# Patient Record
Sex: FEMALE | URBAN - METROPOLITAN AREA
[De-identification: names, ages, dates, MRNs, and addresses within clinical notes are randomized per-mention and may not be internally consistent; named-entity substitution may affect disease eponyms.]

---

## 2018-06-12 ENCOUNTER — OFFICE VISIT (OUTPATIENT)
Dept: INTERNAL MEDICINE CLINIC | Age: 38
End: 2018-06-12

## 2018-06-12 VITALS
WEIGHT: 171 LBS | OXYGEN SATURATION: 99 % | HEART RATE: 80 BPM | SYSTOLIC BLOOD PRESSURE: 110 MMHG | BODY MASS INDEX: 25.33 KG/M2 | RESPIRATION RATE: 16 BRPM | HEIGHT: 69 IN | DIASTOLIC BLOOD PRESSURE: 80 MMHG | TEMPERATURE: 98.1 F

## 2018-06-12 DIAGNOSIS — Z23 ENCOUNTER FOR IMMUNIZATION: ICD-10-CM

## 2018-06-12 DIAGNOSIS — D22.9 NUMEROUS MOLES: ICD-10-CM

## 2018-06-12 DIAGNOSIS — Z00.00 ROUTINE GENERAL MEDICAL EXAMINATION AT A HEALTH CARE FACILITY: Primary | ICD-10-CM

## 2018-06-12 DIAGNOSIS — S76.312A HAMSTRING MUSCLE STRAIN, LEFT, INITIAL ENCOUNTER: ICD-10-CM

## 2018-06-12 NOTE — PATIENT INSTRUCTIONS
It was a pleasure to meet you! As discussed:    Health Maintenance   I have ordered your age appropriate labs please complete them. You will need to fast 10-12hrs before your appointment. Great job on [employer] and exercising! Remember goal for exercise is 150minutes of moderate exercise a week and diet goal is to eat 50% fruits and vegetables with minimal sugar, fat and oil daily. See health.gov or choosemyplate.gov for more details. Your cervical cancer and breast cancer screening will be completed by your ob/ gyn as scheduled. Hamstring Syndrome: Care Instructions  Your Care Instructions    The hamstring muscles are the three muscles in the back of the thigh. The sciatic nerve is a large nerve that runs from the low back down the legs. Hamstring syndrome is a condition caused by pressure on this nerve. The nerve may be pinched between the hamstring muscles and the pelvic bone or by the band of tissue that connects the hamstring muscles. This condition can cause pain in the hip and buttock and sometimes numbness down the back of the leg. It may hurt to sit down or stretch the hamstrings. You may have less pain when you lie on your back. Hamstring syndrome may be the result of wear and tear to the back and hamstrings. It is most often seen in people who play sports that involve running, kicking, or jumping. Other problems can cause leg pain and numbness. To diagnose hamstring syndrome, the doctor will ask about your symptoms and your activities and examine your leg. Hamstring syndrome usually gets better in a few weeks with rest and home care. The doctor may recommend exercises to stretch and strengthen your hip muscles. If home care doesn't help, your doctor may suggest a steroid shot to help reduce pain and swelling. Follow-up care is a key part of your treatment and safety. Be sure to make and go to all appointments, and call your doctor if you are having problems.  It's also a good idea to know your test results and keep a list of the medicines you take. How can you care for yourself at home? · Ask your doctor if you can take an over-the-counter pain medicine, such as acetaminophen (Tylenol), ibuprofen (Advil, Motrin), or naproxen (Aleve). Be safe with medicines. Read and follow all instructions on the label. · Put ice or a cold pack on the painful area for 10 to 20 minutes at a time. Try to do this every 1 to 2 hours for the next 3 days (when you are awake) or until the swelling goes down. Put a thin cloth between the ice and your skin. · After 2 or 3 days, if your swelling is gone, apply heat. Put a warm water bottle, a heating pad set on low, or a warm cloth over the painful area. Do not go to sleep with a heating pad on your skin. · Avoid sitting if possible, unless it feels better than standing. · Alternate lying down with short walks. Increase your walking distance as you are able to walk without making your symptoms worse. · Don't do anything that makes your symptoms worse. Return to your usual level of activity slowly. When should you call for help? Watch closely for changes in your health, and be sure to contact your doctor if:  ? · You have new or worse pain. ? · You have new symptoms. ? · You do not get better as expected. Where can you learn more? Go to http://rj-td.info/. Enter X635 in the search box to learn more about \"Hamstring Syndrome: Care Instructions. \"  Current as of: March 21, 2017  Content Version: 11.4  © 4584-2955 [employer]. Care instructions adapted under license by [employer] (which disclaims liability or warranty for this information). If you have questions about a medical condition or this instruction, always ask your healthcare professional. [address] any warranty or liability for your use of this information.

## 2018-06-12 NOTE — PROGRESS NOTES
Subjective:   40 y.o. female for Well Woman Check. Her gyne and breast care is done elsewhere by her Ob-Gyne physician. ROS: Feeling generally well. No TIA's or unusual headaches, no dysphagia. No prolonged cough. No dyspnea or chest pain on exertion. No abdominal pain, change in bowel habits, black or bloody stools. No urinary tract symptoms. No new or unusual musculoskeletal symptoms. Specific concerns today:    Hamstring Strain  Recently started running x 9 months. After 10K had left posterior thigh pain. Rested in May and stretching. Improving but still has occasional pain with running. SHx: Works for [employer]. [place] recently moved back. Objective: The patient appears well, alert, oriented x 3, in no distress. Visit Vitals    /80 (BP 1 Location: Right arm, BP Patient Position: Sitting)    Pulse 80    Temp 98.1 °F (36.7 °C) (Oral)    Resp 16    Ht 5' 8.75\" (1.746 m)    Wt 171 lb (77.6 kg)    SpO2 99%    BMI 25.44 kg/m2     ENT normal.  Neck supple. No adenopathy or thyromegaly. Lungs are clear, good air entry, no wheezes, rhonchi or rales. S1 and S2 normal, no murmurs, regular rate and rhythm. Abdomen soft without tenderness, guarding, mass or organomegaly. Extremities show no edema, normal peripheral pulses. Neurological is normal, no focal findings. Breast and Pelvic exams are deferred. Assessment/Plan:   Diagnoses and all orders for this visit:    1. Routine general medical examination at a health care facility- Health Maintenance reviewed and addressed as ordered below   -     LIPID PANEL  -     METABOLIC PANEL, COMPREHENSIVE  -     CBC WITH AUTOMATED DIFF  -     VITAMIN D, 25 HYDROXY  -     REFERRAL TO GYNECOLOGY  -     TSH 3RD GENERATION  -     T4, FREE    2. Hamstring muscle strain, left, initial encounter- improving. However still having pain given activity level would benefit from seeing sports medicine.   -     REFERRAL TO SPORTS MEDICINE    3.  Numerous moles- due for skin check   -     REFERRAL TO DERMATOLOGY      Follow-up Disposition:  Return in about 1 year (around 6/12/2019) for Physical - 30 minute appointment. Medication risks/benefits/costs/interactions/alternatives discussed with patient. Sal Jimenez  was given an after visit summary which includes diagnoses, current medications, & vitals. she expressed understanding with the diagnosis and plan.

## 2018-06-12 NOTE — MR AVS SNAPSHOT
[address] 
[phone] Patient: Arline Austin MRN: [id number] EWZ:5/[id number]/[id number] Visit Information Date & Time Provider Department Dept. Phone Encounter #  
 6/12/2018  2:00 PM Ludy Barron MD [address] [address] Internal Medicine [phone] [id number] Follow-up Instructions Return in about 1 year (around 6/12/2019) for Physical - 30 minute appointment. Upcoming Health Maintenance Date Due DTaP/Tdap/Td series (1 - Tdap) 9/10/2001 PAP AKA CERVICAL CYTOLOGY 9/10/2001 Influenza Age 5 to Adult 8/1/2018 Allergies as of 6/12/2018  Review Complete On: 6/12/2018 By: Ludy Barron MD  
 No Known Allergies Current Immunizations  Never Reviewed No immunizations on file. Not reviewed this visit You Were Diagnosed With   
  
 Codes Comments Routine general medical examination at a health care facility    -  Primary ICD-10-CM: Z00.00 ICD-9-CM: V70.0 Hamstring muscle strain, left, initial encounter     ICD-10-CM: U43.120B ICD-9-CM: 843.8 Numerous moles     ICD-10-CM: D22.9 ICD-9-CM: 216.9 Vitals BP Pulse Temp Resp Height(growth percentile) Weight(growth percentile) 110/80 (BP 1 Location: Right arm, BP Patient Position: Sitting) 80 98.1 °F (36.7 °C) (Oral) 16 5' 8.75\" (1.746 m) 171 lb (77.6 kg) SpO2 BMI OB Status Smoking Status 99% 25.44 kg/m2 Having regular periods Former Smoker BMI and BSA Data Body Mass Index Body Surface Area  
 25.44 kg/m 2 1.94 m 2 Your Updated Medication List  
  
Notice  As of 6/12/2018  3:05 PM  
 You have not been prescribed any medications. We Performed the Following CBC WITH AUTOMATED DIFF [35909 CPT(R)] LIPID PANEL [55307 CPT(R)] METABOLIC PANEL, COMPREHENSIVE [73838 CPT(R)] REFERRAL TO DERMATOLOGY [REF19 Custom] REFERRAL TO GYNECOLOGY [REF30 Custom] REFERRAL TO SPORTS MEDICINE [NNM179 Custom] T4, FREE [id number] CPT(R)] TSH 3RD GENERATION [70427 CPT(R)] VITAMIN D, 25 HYDROXY [id number] CPT(R)] Follow-up Instructions Return in about 1 year (around 6/12/2019) for Physical - 30 minute appointment. Referral Information Referral ID Referred By Referred To  
  
 [id number] Javan Tomas [address] [facility], [address] Phone: [phone] Fax: [phone] Visits Status Start Date End Date 1 New Request 6/12/18 6/12/19 If your referral has a status of pending review or denied, additional information will be sent to support the outcome of this decision. Referral ID Referred By Referred To  
 [id number] Brendan Méndez, [address] [place]  
   [address] [address], [address] Phone: [phone] Fax: [phone] Visits Status Start Date End Date 1 New Request 6/12/18 6/12/19 If your referral has a status of pending review or denied, additional information will be sent to support the outcome of this decision. Referral ID Referred By Referred To  
 [id number] Javan Tomas Not Available Visits Status Start Date End Date 1 New Request 6/12/18 6/12/19 If your referral has a status of pending review or denied, additional information will be sent to support the outcome of this decision. Patient Instructions It was a pleasure to meet you! As discussed: 
 
Health Maintenance I have ordered your age appropriate labs please complete them. You will need to fast 10-12hrs before your appointment. Great job on [employer] and exercising! Remember goal for exercise is 150minutes of moderate exercise a week and diet goal is to eat 50% fruits and vegetables with minimal sugar, fat and oil daily. See health.gov or choosemyplate.gov for more details. Your cervical cancer and breast cancer screening will be completed by your ob/ gyn as scheduled. Hamstring Syndrome: Care Instructions Your Care Instructions The hamstring muscles are the three muscles in the back of the thigh. The sciatic nerve is a large nerve that runs from the low back down the legs. Hamstring syndrome is a condition caused by pressure on this nerve. The nerve may be pinched between the hamstring muscles and the pelvic bone or by the band of tissue that connects the hamstring muscles. This condition can cause pain in the hip and buttock and sometimes numbness down the back of the leg. It may hurt to sit down or stretch the hamstrings. You may have less pain when you lie on your back. Hamstring syndrome may be the result of wear and tear to the back and hamstrings. It is most often seen in people who play sports that involve running, kicking, or jumping. Other problems can cause leg pain and numbness. To diagnose hamstring syndrome, the doctor will ask about your symptoms and your activities and examine your leg. Hamstring syndrome usually gets better in a few weeks with rest and home care. The doctor may recommend exercises to stretch and strengthen your hip muscles. If home care doesn't help, your doctor may suggest a steroid shot to help reduce pain and swelling. Follow-up care is a key part of your treatment and safety. Be sure to make and go to all appointments, and call your doctor if you are having problems. It's also a good idea to know your test results and keep a list of the medicines you take. How can you care for yourself at home? · Ask your doctor if you can take an over-the-counter pain medicine, such as acetaminophen (Tylenol), ibuprofen (Advil, Motrin), or naproxen (Aleve). Be safe with medicines. Read and follow all instructions on the label.  
· Put ice or a cold pack on the painful area for 10 to 20 minutes at a time. Try to do this every 1 to 2 hours for the next 3 days (when you are awake) or until the swelling goes down. Put a thin cloth between the ice and your skin. · After 2 or 3 days, if your swelling is gone, apply heat. Put a warm water bottle, a heating pad set on low, or a warm cloth over the painful area. Do not go to sleep with a heating pad on your skin. · Avoid sitting if possible, unless it feels better than standing. · Alternate lying down with short walks. Increase your walking distance as you are able to walk without making your symptoms worse. · Don't do anything that makes your symptoms worse. Return to your usual level of activity slowly. When should you call for help? Watch closely for changes in your health, and be sure to contact your doctor if: 
? · You have new or worse pain. ? · You have new symptoms. ? · You do not get better as expected. Where can you learn more? Go to http://rj-td.info/. Enter X499 in the search box to learn more about \"Hamstring Syndrome: Care Instructions. \" Current as of: March 21, 2017 Content Version: 11.4 © 2704-9735 [employer]. Care instructions adapted under license by [employer] (which disclaims liability or warranty for this information). If you have questions about a medical condition or this instruction, always ask your healthcare professional. [address] any warranty or liability for your use of this information. Introducing [facility]! Essence Gregory introduces [employer] patient portal. Now you can access parts of your medical record, email your doctor's office, and request medication refills online. 1. In your internet browser, go to [email or web address] 2. Click on the First Time User? Click Here link in the Sign In box. You will see the New Member Sign Up page. 3. Enter your [employer] Access Code exactly as it appears below.  You will not need to use this code after youve completed the sign-up process. If you do not sign up before the expiration date, you must request a new code. · [employer] Access Code: [email or web address] Expires: 9/10/2018  1:47 PM 
 
4. Enter the last four digits of your Social Security Number (xxxx) and Date of Birth (mm/dd/yyyy) as indicated and click Submit. You will be taken to the next sign-up page. 5. Create a [employer] ID. This will be your [employer] login ID and cannot be changed, so think of one that is secure and easy to remember. 6. Create a [employer] password. You can change your password at any time. 7. Enter your Password Reset Question and Answer. This can be used at a later time if you forget your password. 8. Enter your e-mail address. You will receive e-mail notification when new information is available in [address]. 9. Click Sign Up. You can now view and download portions of your medical record. 10. Click the Download Summary menu link to download a portable copy of your medical information. If you have questions, please visit the Frequently Asked Questions section of the [employer] website. Remember, [employer] is NOT to be used for urgent needs. For medical emergencies, dial 911. Now available from your iPhone and Android! Please provide this summary of care documentation to your next provider. Your primary care clinician is listed as Temo Segura. If you have any questions after today's visit, please call [phone].

## 2018-06-15 LAB
25(OH)D3+25(OH)D2 SERPL-MCNC: 32.8 NG/ML (ref 30–100)
ALBUMIN SERPL-MCNC: 4.4 G/DL (ref 3.5–5.5)
ALBUMIN/GLOB SERPL: 1.8 {RATIO} (ref 1.2–2.2)
ALP SERPL-CCNC: 34 IU/L (ref 39–117)
ALT SERPL-CCNC: 8 IU/L (ref 0–32)
AST SERPL-CCNC: 12 IU/L (ref 0–40)
BASOPHILS # BLD AUTO: 0.1 X10E3/UL (ref 0–0.2)
BASOPHILS NFR BLD AUTO: 1 %
BILIRUB SERPL-MCNC: 0.6 MG/DL (ref 0–1.2)
BUN SERPL-MCNC: 13 MG/DL (ref 6–20)
BUN/CREAT SERPL: 18 (ref 9–23)
CALCIUM SERPL-MCNC: 9.3 MG/DL (ref 8.7–10.2)
CHLORIDE SERPL-SCNC: 104 MMOL/L (ref 96–106)
CHOLEST SERPL-MCNC: 183 MG/DL (ref 100–199)
CO2 SERPL-SCNC: 24 MMOL/L (ref 20–29)
CREAT SERPL-MCNC: 0.73 MG/DL (ref 0.57–1)
EOSINOPHIL # BLD AUTO: 0.1 X10E3/UL (ref 0–0.4)
EOSINOPHIL NFR BLD AUTO: 2 %
ERYTHROCYTE [DISTWIDTH] IN BLOOD BY AUTOMATED COUNT: 13.5 % (ref 12.3–15.4)
GFR SERPLBLD CREATININE-BSD FMLA CKD-EPI: 106 ML/MIN/1.73
GFR SERPLBLD CREATININE-BSD FMLA CKD-EPI: 122 ML/MIN/1.73
GLOBULIN SER CALC-MCNC: 2.4 G/DL (ref 1.5–4.5)
GLUCOSE SERPL-MCNC: 88 MG/DL (ref 65–99)
HCT VFR BLD AUTO: 39.8 % (ref 34–46.6)
HDLC SERPL-MCNC: 72 MG/DL
HGB BLD-MCNC: 13.4 G/DL (ref 11.1–15.9)
IMM GRANULOCYTES # BLD: 0 X10E3/UL (ref 0–0.1)
IMM GRANULOCYTES NFR BLD: 0 %
LDLC SERPL CALC-MCNC: 99 MG/DL (ref 0–99)
LYMPHOCYTES # BLD AUTO: 1.5 X10E3/UL (ref 0.7–3.1)
LYMPHOCYTES NFR BLD AUTO: 26 %
MCH RBC QN AUTO: 32 PG (ref 26.6–33)
MCHC RBC AUTO-ENTMCNC: 33.7 G/DL (ref 31.5–35.7)
MCV RBC AUTO: 95 FL (ref 79–97)
MONOCYTES # BLD AUTO: 0.4 X10E3/UL (ref 0.1–0.9)
MONOCYTES NFR BLD AUTO: 7 %
NEUTROPHILS # BLD AUTO: 3.5 X10E3/UL (ref 1.4–7)
NEUTROPHILS NFR BLD AUTO: 64 %
PLATELET # BLD AUTO: 238 X10E3/UL (ref 150–379)
POTASSIUM SERPL-SCNC: 4.4 MMOL/L (ref 3.5–5.2)
PROT SERPL-MCNC: 6.8 G/DL (ref 6–8.5)
RBC # BLD AUTO: 4.19 X10E6/UL (ref 3.77–5.28)
SODIUM SERPL-SCNC: 140 MMOL/L (ref 134–144)
T4 FREE SERPL-MCNC: 1.13 NG/DL (ref 0.82–1.77)
TRIGL SERPL-MCNC: 58 MG/DL (ref 0–149)
TSH SERPL DL<=0.005 MIU/L-ACNC: 1.85 UIU/ML (ref 0.45–4.5)
VLDLC SERPL CALC-MCNC: 12 MG/DL (ref 5–40)
WBC # BLD AUTO: 5.5 X10E3/UL (ref 3.4–10.8)

## 2018-06-20 NOTE — PROGRESS NOTES
Thank you for completing your labs. Your thyroid, kidney and liver function is normal.   Your cholesterol is well controlled. No medication changes are needed. Do not hesitate to contact the office if you have any questions or concerns before your next appointment.      Kind regards,   Dr. Ferro Breezy

## 2018-06-28 ENCOUNTER — OFFICE VISIT (OUTPATIENT)
Dept: INTERNAL MEDICINE CLINIC | Age: 38
End: 2018-06-28

## 2018-06-28 VITALS
WEIGHT: 168.2 LBS | DIASTOLIC BLOOD PRESSURE: 63 MMHG | TEMPERATURE: 98.4 F | HEART RATE: 80 BPM | BODY MASS INDEX: 24.91 KG/M2 | RESPIRATION RATE: 16 BRPM | HEIGHT: 69 IN | SYSTOLIC BLOOD PRESSURE: 116 MMHG | OXYGEN SATURATION: 97 %

## 2018-06-28 DIAGNOSIS — M76.892 HAMSTRING TENDONITIS OF LEFT THIGH: Primary | ICD-10-CM

## 2018-06-28 DIAGNOSIS — M22.2X2 PATELLOFEMORAL SYNDROME OF BOTH KNEES: ICD-10-CM

## 2018-06-28 DIAGNOSIS — M22.2X1 PATELLOFEMORAL SYNDROME OF BOTH KNEES: ICD-10-CM

## 2018-06-28 NOTE — PROGRESS NOTES
Chief Complaint   Patient presents with    Muscle Pain     she is a 40y.o. year old female who presents for evaluation of back leg pain  Pain Assessment Encounter      Oskar Frazier  6/28/2018  Onset of Symptoms: April 2018  ________________________________________________________________________  Description: Patient states that she ran the race and she felt pain after and thinks she pulled a muscle. Frequency: more than 5 times a day  Pain Scale:(1-10): 7  Trauma Hx: running  Hx of similar symptoms: No:   Radiation: NO,   Duration:  continuous      Progression: has improved  What makes it better?: ice, OTC meds and rest  What makes it worse?:exercise  Medications tried: acetaminophen, ibuprofen    Reviewed and agree with Nurse Note and duplicated in this note. Reviewed PmHx, RxHx, FmHx, SocHx, AllgHx and updated and dated in the chart. History reviewed. No pertinent family history. History reviewed. No pertinent past medical history.    Social History     Social History    Marital status: UNKNOWN     Spouse name: N/A    Number of children: N/A    Years of education: N/A     Social History Main Topics    Smoking status: Former Smoker     Types: Cigarettes     Quit date: 1/1/2006    Smokeless tobacco: Never Used    Alcohol use 0.0 oz/week     0 Glasses of wine per week      Comment: 1-2 monthly    Drug use: No    Sexual activity: Yes     Partners: Male     Birth control/ protection: Condom     Other Topics Concern    None     Social History Narrative        Review of Systems - negative except as listed above      Objective:     Vitals:    06/28/18 1420   BP: 116/63   Pulse: 80   Resp: 16   Temp: 98.4 °F (36.9 °C)   TempSrc: Oral   SpO2: 97%   Weight: 168 lb 3.2 oz (76.3 kg)   Height: 5' 8.75\" (1.746 m)       Physical Examination: General appearance - alert, well appearing, and in no distress  Back exam - full range of motion, no tenderness, palpable spasm or pain on motion  Neurological - alert, oriented, normal speech, no focal findings or movement disorder noted  Musculoskeletal - left hamstring - biceps femoris pain with palpation of midbelly, no defect noted, flexion at knee 5 out of 5 extension  5 out of 5  Extremities - peripheral pulses normal, no pedal edema, no clubbing or cyanosis  Skin - normal coloration and turgor, no rashes, no suspicious skin lesions noted    Assessment/ Plan:   Diagnoses and all orders for this visit:    1. Hamstring tendonitis of left thigh    2. Patellofemoral syndrome of both knees       Pathophysiology, recovery and rehabilitation process discussed and questions answered   Counseling for 30 Minutes of the total visit duration   Pictures and figures used as necessary   Provided reassurance   Handouts provided and reviewed with patient for Hamstring and patella femoral  Monitor response to home exercises   Recommend activity modification   Recommend  lower impact activities-walking, Eliptical, Nordic Track, cycling or swimming   Follow up in 4 week(s)     Patient was informed/counseled on: Body mass index is 25.02 kg/(m^2). 1) Remember to stay active and/or exercise regularly (I suggest 30-45 minutes daily)   2) For reliable dietary information, go to www. EATRIGHT.org. You may wish to consider seeing the nutritionist at [facility] [phone], also consider the [address] I have discussed the diagnosis with the patient and the intended plan as seen in the above orders. The patient has received an after-visit summary and questions were answered concerning future plans. Medication Side Effects and Warnings were discussed with patient: yes  Patient Labs were reviewed and or requested: yes  Patient Past Records were reviewed and or requested  yes  I have discussed the diagnosis with the patient and the intended plan as seen in the above orders.   The patient has received an after-visit summary and questions were answered concerning future plans.     Pt agrees to call or return to clinic and/or go to closest ER with any worsening of symptoms. This may include, but not limited to increased fever (>100.4) with NSAIDS or Tylenol, increased edema, confusion, rash, worsening of presenting symptoms.

## 2018-06-28 NOTE — MR AVS SNAPSHOT
[address] [facility]. [address] [address] 
[phone] Patient: Melinda Neal MRN: [id number] UNE:[birth year] Visit Information Date & Time Provider Department Dept. Phone Encounter #  
 6/28/2018  2:00 PM [facility] MD Molly [facility] Sports Medicine and Primary Care [phone] [id number] Follow-up Instructions Return if symptoms worsen or fail to improve. Follow-up and Disposition History Your Appointments 7/18/2018  9:00 AM  
New Patient with Augusto Olmedo MD  
[address] ([address]) Appt Note: [address] [facility] [address]  
[address], [address] [facility] [address] [address]  
  
    
 6/13/2019  2:00 PM  
PHYSICAL with Erik Everett MD  
[address] [address] Internal Medicine [address] [facility]) Appt Note: [address] [facility] [address] [facility] [address]  
[address] [address] [facility] [address] Upcoming Health Maintenance Date Due  
 PAP AKA CERVICAL CYTOLOGY 9/10/2001 Influenza Age 5 to Adult 8/1/2018 DTaP/Tdap/Td series (2 - Td) 6/12/2028 Allergies as of 6/28/2018  Review Complete On: 6/28/2018 By: [facility] MD Molly  
 No Known Allergies Current Immunizations  Reviewed on 6/12/2018 Name Date Tdap 6/12/2018 Not reviewed this visit You Were Diagnosed With   
  
 Codes Comments Hamstring tendonitis of left thigh    -  Primary ICD-10-CM: S03.865 ICD-9-CM: 727.09 Patellofemoral syndrome of both knees     ICD-10-CM: M22.2X1, M22.2X2 ICD-9-CM: 719.46 Vitals BP Pulse Temp Resp Height(growth percentile) Weight(growth percentile) 116/63 (BP 1 Location: Right arm, BP Patient Position: Sitting) 80 98.4 °F (36.9 °C) (Oral) 16 5' 8.75\" (1.746 m) 168 lb 3.2 oz (76.3 kg) LMP SpO2 BMI OB Status Smoking Status 06/14/2018 (Approximate) 97% 25.02 kg/m2 Having regular periods Former Smoker Vitals History BMI and BSA Data Body Mass Index Body Surface Area 25.02 kg/m 2 1.92 m 2 Your Updated Medication List  
  
Notice  As of 6/28/2018  3:34 PM  
 You have not been prescribed any medications. Follow-up Instructions Return if symptoms worsen or fail to improve. Introducing [facility]! Dear Carlos Rodríguez: 
Thank you for requesting a [employer] account. Our records indicate that you already have an active [employer] account. You can access your account anytime at [email or web address] Did you know that you can access your hospital and ER discharge instructions at any time in Payfirma? You can also review all of your test results from your hospital stay or ER visit. Additional Information If you have questions, please visit the Frequently Asked Questions section of the Payfirma website at [email or web address] Remember, [employer] is NOT to be used for urgent needs. For medical emergencies, dial 911. Now available from your iPhone and Android! Please provide this summary of care documentation to your next provider. Your primary care clinician is listed as Radha Perera. If you have any questions after today's visit, please call [phone].

## 2018-07-18 ENCOUNTER — OFFICE VISIT (OUTPATIENT)
Dept: OBGYN CLINIC | Age: 38
End: 2018-07-18

## 2018-07-18 VITALS
DIASTOLIC BLOOD PRESSURE: 74 MMHG | SYSTOLIC BLOOD PRESSURE: 112 MMHG | WEIGHT: 163.8 LBS | BODY MASS INDEX: 24.26 KG/M2 | HEIGHT: 69 IN

## 2018-07-18 DIAGNOSIS — Z01.419 WELL WOMAN EXAM: Primary | ICD-10-CM

## 2018-07-18 DIAGNOSIS — Z11.51 SCREENING FOR HUMAN PAPILLOMAVIRUS: ICD-10-CM

## 2018-07-18 NOTE — PROGRESS NOTES
Annual exam    Andrew Sepulveda is a 40 y.o. G0 presenting for annual exam. Overall doing well. Interested in [address] IUD. Ob/Gyn Hx:  G0 -  LMP- 7/11/18  Menarche- age 15  Menses- regular monthly cycles? yes, last 5 days, passage of clots? no, intermenstrual spotting? no, Number of pads/tampons per day? 3  Contraception- condoms  STI- denies  ? SA- not currently    Health maintenance:  Pap- few years ago, normal per patient  Mammo- not indicated  Colonoscopy- not indicated  Gardasil- outside of recommended age    History reviewed. No pertinent past medical history. Past Surgical History:   Procedure Laterality Date    HX ORTHOPAEDIC  2011    finger       Family History   Problem Relation Age of Onset    Celiac Disease Mother     No Known Problems Father     Bipolar Disorder Sister     Breast Cancer Paternal Grandmother        Social History     Social History    Marital status: SINGLE     Spouse name: N/A    Number of children: N/A    Years of education: N/A     Occupational History    Not on file. Social History Main Topics    Smoking status: Former Smoker     Types: Cigarettes     Quit date: 1/1/2006    Smokeless tobacco: Never Used    Alcohol use 0.0 oz/week     0 Glasses of wine per week      Comment: 1-2 monthly    Drug use: No    Sexual activity: Not Currently     Partners: Male     Birth control/ protection: Condom     Other Topics Concern    Not on file     Social History Narrative       Current Outpatient Prescriptions   Medication Sig Dispense Refill    MELATONIN PO Take  by mouth.  BIOTIN PO Take  by mouth.  MULTIVITAMIN PO Take  by mouth.          No Known Allergies    Review of Systems - History obtained from the patient  Constitutional: negative for weight loss, fever, night sweats  HEENT: negative for hearing loss, earache, congestion, snoring, sorethroat  CV: negative for chest pain, palpitations, edema  Resp: negative for cough, shortness of breath, wheezing  GI: negative for change in bowel habits, abdominal pain, black or bloody stools  : negative for frequency, dysuria, hematuria, vaginal discharge  MSK: negative for back pain, joint pain, muscle pain  Breast: negative for breast lumps, nipple discharge, galactorrhea  Skin :negative for itching, rash, hives  Neuro: negative for dizziness, headache, confusion, weakness  Psych: negative for anxiety, depression, change in mood  Heme/lymph: negative for bleeding, bruising, pallor    Physical Exam    Visit Vitals    /74 (BP 1 Location: Left arm, BP Patient Position: Sitting)    Ht 5' 8.75\" (1.746 m)    Wt 163 lb 12.8 oz (74.3 kg)    LMP 07/11/2018 (Exact Date)    BMI 24.37 kg/m2       Constitutional  · Appearance: well-nourished, well developed, alert, in no acute distress    HENT  · Head and Face: appears normal    Neck  · Inspection/Palpation: normal appearance, no masses or tenderness  · Lymph Nodes: no lymphadenopathy present  · Thyroid: gland size normal, nontender, no nodules or masses present on palpation    Chest  · Respiratory Effort: non-labored breathing  · Auscultation: CTAB, normal breath sounds    Cardiovascular  · Heart:  · Auscultation: regular rate and rhythm without murmur  · Extremities: no peripheral edema    Breasts  · Inspection of Breasts: breasts symmetrical, no skin changes, no discharge present, nipple appearance normal, no skin retraction present  · Palpation of Breasts and Axillae: no masses present on palpation, no breast tenderness  · Axillary Lymph Nodes: no lymphadenopathy present    Gastrointestinal  · Abdominal Examination: abdomen non-tender to palpation, normal bowel sounds, no masses present  · Liver and spleen: no hepatomegaly present, spleen not palpable  · Hernias: no hernias identified    Genitourinary  · External Genitalia: normal appearance for age, no discharge present, no tenderness present, no inflammatory lesions present, no masses present, no atrophy present  · Vagina: normal vaginal vault without central or paravaginal defects, no discharge present, no inflammatory lesions present, no masses present  · Bladder: non-tender to palpation  · Urethra: appears normal  · Cervix: normal   · Uterus: normal size, shape and consistency  · Adnexa: no adnexal tenderness present, no adnexal masses present  · Perineum: perineum within normal limits, no evidence of trauma, no rashes or skin lesions present    Skin  · General Inspection: no rash, no lesions identified    Neurologic/Psychiatric  · Mental Status:  · Orientation: grossly oriented to person, place and time  · Mood and Affect: mood normal, affect appropriate      Assessment/Plan:  40 y.o. G0 presenting for annual exam. Overall doing well.      Health Maintenance:  -counseled re: diet, exercise, healthy lifestyle  -pap/HPV today  -declines STI testing  -initiate mammo age 36, self breast assessments  -reviewed hormonal options for cycle control, interested in Mirena IUD, information provided, will call to schedule appt for insertion    RTC: 1 year for AE or sooner for Mirena insertion    Gerda Lyons MD  7/18/2018  9:48 AM

## 2018-07-18 NOTE — PATIENT INSTRUCTIONS
Pap Test: Care Instructions  Your Care Instructions    The Pap test (also called a Pap smear) is a screening test for cancer of the cervix, which is the lower part of the uterus that opens into the vagina. The test can help your doctor find early changes in the cells that could lead to cancer. The sample of cells taken during your test has been sent to a lab so that an expert can look at the cells. It usually takes a week or two to get the results back. Follow-up care is a key part of your treatment and safety. Be sure to make and go to all appointments, and call your doctor if you are having problems. It's also a good idea to know your test results and keep a list of the medicines you take. What do the results mean? · A normal result means that the test did not find any abnormal cells in the sample. · An abnormal result can mean many things. Most of these are not cancer. The results of your test may be abnormal because:  ¨ You have an infection of the vagina or cervix, such as a yeast infection. ¨ You have an IUD (intrauterine device for birth control). ¨ You have low estrogen levels after menopause that are causing the cells to change. ¨ You have cell changes that may be a sign of precancer or cancer. The results are ranked based on how serious the changes might be. There are many other reasons why you might not get a normal result. If the results were abnormal, you may need to get another test within a few weeks or months. If the results show changes that could be a sign of cancer, you may need a test called a colposcopy, which provides a more complete view of the cervix. Sometimes the lab cannot use the sample because it does not contain enough cells or was not preserved well. If so, you may need to have the test again. This is not common, but it does happen from time to time. When should you call for help?   Watch closely for changes in your health, and be sure to contact your doctor if:    · You have vaginal bleeding or pain for more than 2 days after the test. It is normal to have a small amount of bleeding for a day or two after the test.   Where can you learn more? Go to http://rj-td.info/. Enter K812 in the search box to learn more about \"Pap Test: Care Instructions. \"  Current as of: May 12, 2017  Content Version: 11.7  © 2602-7135 [employer]. Care instructions adapted under license by [employer] (which disclaims liability or warranty for this information). If you have questions about a medical condition or this instruction, always ask your healthcare professional. [address] any warranty or liability for your use of this information.

## 2018-07-22 LAB
CYTOLOGIST CVX/VAG CYTO: ABNORMAL
CYTOLOGY CVX/VAG DOC THIN PREP: ABNORMAL
DX ICD CODE: ABNORMAL
DX ICD CODE: ABNORMAL
HPV I/H RISK 4 DNA CVX QL PROBE+SIG AMP: NEGATIVE
Lab: ABNORMAL
OTHER STN SPEC: ABNORMAL
PATH REPORT.FINAL DX SPEC: ABNORMAL
PATHOLOGIST CVX/VAG CYTO: ABNORMAL
STAT OF ADQ CVX/VAG CYTO-IMP: ABNORMAL

## 2018-08-08 ENCOUNTER — OFFICE VISIT (OUTPATIENT)
Dept: OBGYN CLINIC | Age: 38
End: 2018-08-08

## 2018-08-08 VITALS
HEIGHT: 69 IN | BODY MASS INDEX: 24.65 KG/M2 | DIASTOLIC BLOOD PRESSURE: 76 MMHG | SYSTOLIC BLOOD PRESSURE: 110 MMHG | WEIGHT: 166.4 LBS

## 2018-08-08 DIAGNOSIS — Z30.430 VISIT FOR INSERTION OF INTRAUTERINE DEVICE: Primary | ICD-10-CM

## 2018-08-08 LAB
HCG URINE, QL. (POC): NEGATIVE
VALID INTERNAL CONTROL?: YES

## 2018-08-08 NOTE — PATIENT INSTRUCTIONS
Intrauterine Device (IUD) Insertion: Care Instructions  Your Care Instructions    The intrauterine device (IUD) is a very effective method of birth control. It is a small, plastic, T-shaped device that contains copper or hormones. The doctor inserts the IUD into your uterus. A plastic string tied to the end of the IUD hangs down through the cervix into the vagina. There are two types of IUDs. The copper IUD is effective for up to 10 years. The hormonal IUD is effective for either 3 years or 5 years, depending on which IUD is used. The hormonal IUD also reduces menstrual bleeding and cramping. Both types of IUD damage or kill the man's sperm. This means that the woman's egg does not join with the sperm. IUDs also change the lining of the uterus so that the egg does not lodge there. The IUD is most likely to work well for women who have been pregnant before. Some women who have never been pregnant have more trouble keeping the IUD in the uterus. They also may have more pain and cramping after insertion. Follow-up care is a key part of your treatment and safety. Be sure to make and go to all appointments, and call your doctor if you are having problems. It's also a good idea to know your test results and keep a list of the medicines you take. How can you care for yourself at home? · You may experience some mild cramping and light bleeding (spotting) for 1 or 2 days. Use a hot water bottle or a heating pad set on low on your belly for pain. · Take an over-the-counter pain medicine, such as acetaminophen (Tylenol), ibuprofen (Advil, Motrin), and naproxen (Aleve) if needed. Read and follow all instructions on the label. · Do not take two or more pain medicines at the same time unless the doctor told you to. Many pain medicines have acetaminophen, which is Tylenol. Too much acetaminophen (Tylenol) can be harmful. · Check the string of your IUD after every period.  To do this, insert a finger into your vagina and feel for the cervix, which is at the top of the vagina and feels harder than the rest of your vagina. You should be able to feel the thin, plastic string coming out of the opening of your cervix. If you cannot feel the string, use another form of birth control and make an appointment with your doctor to have the string checked. · If the IUD comes out, save it and call your doctor. Be sure to use another form of birth control while the IUD is out. · Use latex condoms to protect against sexually transmitted infections (STIs), such as gonorrhea and chlamydia. An IUD does not protect you from STIs. Having one sex partner (who does not have STIs and does not have sex with anyone else) is a good way to avoid STIs. When should you call for help? Call 911 anytime you think you may need emergency care. For example, call if:    · You passed out (lost consciousness).     · You have sudden, severe pain in your belly or pelvis.    Call your doctor now or seek immediate medical care if:    · You have new belly or pelvic pain.     · You have severe vaginal bleeding. This means that you are soaking through your usual pads or tampons each hour for 2 or more hours.     · You are dizzy or lightheaded, or you feel like you may faint.     · You have a fever and pelvic pain or vaginal discharge.     · You have pelvic pain that is getting worse.    Watch closely for changes in your health, and be sure to contact your doctor if:    · You cannot feel the string, or the IUD comes out.     · You feel sick to your stomach, or you vomit.     · You think you may be pregnant. Where can you learn more? Go to http://rj-td.info/. Enter N431 in the search box to learn more about \"Intrauterine Device (IUD) Insertion: Care Instructions. \"  Current as of: November 21, 2017  Content Version: 11.7  © 5377-1886 [employer].  Care instructions adapted under license by [employer] (which disclaims liability or warranty for this information). If you have questions about a medical condition or this instruction, always ask your healthcare professional. [place] [address] any warranty or liability for your use of this information.

## 2018-08-08 NOTE — PROGRESS NOTES
[facility]  OFFICE PROCEDURE PROGRESS NOTE        Chart reviewed for the following:   Jana Arenas, have reviewed the History, Physical and updated the Allergic reactions for [address] performed immediately prior to start of procedure:   Jana Arenas, have performed the following reviews on Gabby Carrillo prior to the start of the procedure:            * Patient was identified by name and date of birth   * Agreement on procedure being performed was verified  * Risks and Benefits explained to the patient  * Procedure site verified and marked as necessary  * Patient was positioned for comfort  * Consent was signed and verified     Time: 11:40      Date of procedure: 8/8/2018    Procedure performed by:  Aileen Green MD    Provider assisted by: Nereyda Sons LPN    Patient assisted by: self    How tolerated by patient: tolerated the procedure well with no complications    Post Procedural Pain Scale: 0 - No Hurt    Comments: none      Mirena IUD INSERTION  Indications: Gabby Carrillo is a No obstetric history on file. ,  40 y.o. female [facility] Patient's last menstrual period was 07/11/2018 (exact date). Her LMP was normal in duration and amount of flow. She  presents for insertion of an IUD. The risks, benefits and alternatives of IUD insertion were discussed in detail at last visit and reviewed again today. She also has reviewed Mirena information. She has elected to proceed with the insertion today and she states she has no further questions. A urine pregnancy test was negative. Procedure: The pelvic exam revealed normal external genitalia. On bimanual exam the uterus was  anteverted and normal in size with no tenderness present. A speculum was inserted into the vagina and the cervix was visualized. The cervix was prepped with zephiran solution. The anterior lip of the cervix was grasped with a single toothed tenaculum.  The uterus was sounded with a Lyman sound to 7 centimeters. A Mirena was then inserted without difficulty. The string was cut to 3 centimeters. She experienced a mild  amount of cramping. Post Procedure Status:   She tolerated the procedure with mild discomfort. The patient was observed for 10 minutes after the insertion. There were no complications. Patient was discharged in stable condition. The patient received Mirena lot number [id number].

## 2019-06-13 ENCOUNTER — OFFICE VISIT (OUTPATIENT)
Dept: INTERNAL MEDICINE CLINIC | Age: 39
End: 2019-06-13

## 2019-06-13 VITALS
HEART RATE: 77 BPM | OXYGEN SATURATION: 98 % | BODY MASS INDEX: 25.48 KG/M2 | WEIGHT: 172 LBS | RESPIRATION RATE: 16 BRPM | SYSTOLIC BLOOD PRESSURE: 106 MMHG | HEIGHT: 69 IN | DIASTOLIC BLOOD PRESSURE: 70 MMHG | TEMPERATURE: 98.4 F

## 2019-06-13 DIAGNOSIS — Z00.00 WELL WOMAN EXAM (NO GYNECOLOGICAL EXAM): Primary | ICD-10-CM

## 2019-06-13 DIAGNOSIS — H61.21 CERUMINOSIS, RIGHT: ICD-10-CM

## 2019-06-13 NOTE — PROGRESS NOTES
HISTORY OF PRESENT ILLNESS  Fabiola Mina is a 45 y.o. female. HPI  Health Maintenance   Topic Date Due    Influenza Age 5 to Adult  08/01/2019    PAP AKA CERVICAL CYTOLOGY  07/18/2021    DTaP/Tdap/Td series (2 - Td) 06/12/2028    Pneumococcal 0-64 years  Aged Out     Cardiovascular Review:  The patient has no known cardiovascular conditions. Diet and Lifestyle: generally follows a low fat low cholesterol diet, generally follows a low sodium diet, exercises regularly, nonsmoker,   Home BP Monitoring: is not measured at home. Pertinent ROS: no TIA's, no chest pain on exertion, no dyspnea on exertion, no swelling of ankles. ROSper HPI   There are no active problems to display for this patient. Current Outpatient Medications   Medication Sig Dispense Refill    MULTIVITAMIN PO Take  by mouth.  MELATONIN PO Take  by mouth.  BIOTIN PO Take  by mouth. No Known Allergies   Visit Vitals  /70 (BP 1 Location: Right arm)   Pulse 77   Temp 98.4 °F (36.9 °C) (Oral)   Resp 16   Ht 5' 9\" (1.753 m)   Wt 172 lb (78 kg)   SpO2 98%   BMI 25.40 kg/m²       Physical Exam   Constitutional: She is oriented to person, place, and time. She appears well-developed and well-nourished. HENT:   Right Ear: External ear normal.   Left Ear: External ear normal.   Ears:    Mouth/Throat: Oropharynx is clear and moist. No oropharyngeal exudate. Eyes: Conjunctivae are normal. No scleral icterus. Neck: Normal range of motion. Neck supple. No thyromegaly present. Cardiovascular: Normal rate, regular rhythm and normal heart sounds. Exam reveals no gallop and no friction rub. No murmur heard. Pulmonary/Chest: Effort normal and breath sounds normal. No respiratory distress. She has no wheezes. She has no rales. She exhibits no tenderness. Abdominal: Soft. Bowel sounds are normal. She exhibits no distension. There is no tenderness. There is no rebound and no guarding.    Genitourinary:   Genitourinary Comments: Up to date, completed by gynecologist.       Musculoskeletal: Normal range of motion. She exhibits no edema or tenderness. Neurological: She is alert and oriented to person, place, and time. Lab Results   Component Value Date/Time    WBC 5.5 06/14/2018 08:24 AM    HGB 13.4 06/14/2018 08:24 AM    HCT 39.8 06/14/2018 08:24 AM    PLATELET 211 39/34/4728 08:24 AM    MCV 95 06/14/2018 08:24 AM     Lab Results   Component Value Date/Time    Glucose 88 06/14/2018 08:24 AM    LDL, calculated 99 06/14/2018 08:24 AM    Creatinine 0.73 06/14/2018 08:24 AM      Lab Results   Component Value Date/Time    Cholesterol, total 183 06/14/2018 08:24 AM    HDL Cholesterol 72 06/14/2018 08:24 AM    LDL, calculated 99 06/14/2018 08:24 AM    Triglyceride 58 06/14/2018 08:24 AM     Lab Results   Component Value Date/Time    ALT (SGPT) 8 06/14/2018 08:24 AM    AST (SGOT) 12 06/14/2018 08:24 AM    Alk. phosphatase 34 (L) 06/14/2018 08:24 AM    Bilirubin, total 0.6 06/14/2018 08:24 AM    Albumin 4.4 06/14/2018 08:24 AM    Protein, total 6.8 06/14/2018 08:24 AM    PLATELET 571 48/12/3456 08:24 AM     Lab Results   Component Value Date/Time    GFR est non- 06/14/2018 08:24 AM    GFR est  06/14/2018 08:24 AM    Creatinine 0.73 06/14/2018 08:24 AM    BUN 13 06/14/2018 08:24 AM    Sodium 140 06/14/2018 08:24 AM    Potassium 4.4 06/14/2018 08:24 AM    Chloride 104 06/14/2018 08:24 AM    CO2 24 06/14/2018 08:24 AM     Lab Results   Component Value Date/Time    TSH 1.850 06/14/2018 08:24 AM    T4, Free 1.13 06/14/2018 08:24 AM      Lab Results   Component Value Date/Time    Glucose 88 06/14/2018 08:24 AM         ASSESSMENT and PLAN  Diagnoses and all orders for this visit:    1. Well woman exam (no gynecological exam)- Health Maintenance reviewed and addressed. She is UTD on age appropriate screening. Labs deferred this year as no medications and/or concerning symptoms. 2. Ceruminosis, right- Ceruminosis is note. Removed in office. . Instructions for home care to remove and prevent wax buildup are given. If no improvement on  F/u perform exam.  -     REMOVE IMPACTED EAR WAX      Follow-up and Dispositions    · Return for Establish with new primary care. Medication risks/benefits/costs/interactions/alternatives discussed with patient. Mikayla Bowen  was given an after visit summary which includes diagnoses, current medications, & vitals. she expressed understanding with the diagnosis and plan.

## 2019-06-13 NOTE — PATIENT INSTRUCTIONS
It was a pleasure to see you! As discussed: It has been a pleasure caring for you! For your next appointment:  
Schedule with Dr. Prema [facility] [phone] Oli Del Cid MD 
Address: Migel Lennon, [facility], [address] Phone: [phone] Well Visit, Ages 25 to 48: Care Instructions Your Care Instructions Physical exams can help you stay healthy. Your doctor has checked your overall health and may have suggested ways to take good care of yourself. He or she also may have recommended tests. At home, you can help prevent illness with healthy eating, regular exercise, and other steps. Follow-up care is a key part of your treatment and safety. Be sure to make and go to all appointments, and call your doctor if you are having problems. It's also a good idea to know your test results and keep a list of the medicines you take. How can you care for yourself at home? · Reach and stay at a healthy weight. This will lower your risk for many problems, such as obesity, diabetes, heart disease, and high blood pressure. · Get at least 30 minutes of physical activity on most days of the week. Walking is a good choice. You also may want to do other activities, such as running, swimming, cycling, or playing tennis or team sports. Discuss any changes in your exercise program with your doctor. · Do not smoke or allow others to smoke around you. If you need help quitting, talk to your doctor about stop-smoking programs and medicines. These can increase your chances of quitting for good. · Talk to your doctor about whether you have any risk factors for sexually transmitted infections (STIs). Having one sex partner (who does not have STIs and does not have sex with anyone else) is a good way to avoid these infections. · Use birth control if you do not want to have children at this time. Talk with your doctor about the choices available and what might be best for you. · Protect your skin from too much sun. When you're outdoors from 10 a.m. to 4 p.m., stay in the shade or cover up with clothing and a hat with a wide brim. Wear sunglasses that block UV rays. Even when it's cloudy, put broad-spectrum sunscreen (SPF 30 or higher) on any exposed skin. · See a dentist one or two times a year for checkups and to have your teeth cleaned. · Wear a seat belt in the car. · Drink alcohol in moderation, if at all. That means no more than 2 drinks a day for men and 1 drink a day for women. Follow your doctor's advice about when to have certain tests. These tests can spot problems early. For everyone · Cholesterol. Have the fat (cholesterol) in your blood tested after age 21. Your doctor will tell you how often to have this done based on your age, family history, or other things that can increase your risk for heart disease. · Blood pressure. Have your blood pressure checked during a routine doctor visit. Your doctor will tell you how often to check your blood pressure based on your age, your blood pressure results, and other factors. · Vision. Talk with your doctor about how often to have a glaucoma test. 
· Diabetes. Ask your doctor whether you should have tests for diabetes. · Colon cancer. Have a test for colon cancer at age 48. You may have one of several tests. If you are younger than 48, you may need a test earlier if you have any risk factors. Risk factors include whether you already had a precancerous polyp removed from your colon or whether your parent, brother, sister, or child has had colon cancer. For women · Breast exam and mammogram. Talk to your doctor about when you should have a clinical breast exam and a mammogram. Medical experts differ on whether and how often women under 50 should have these tests. Your doctor can help you decide what is right for you. · Pap test and pelvic exam. Begin Pap tests at age 24.  A Pap test is the best way to find cervical cancer. The test often is part of a pelvic exam. Ask how often to have this test. 
· Tests for sexually transmitted infections (STIs). Ask whether you should have tests for STIs. You may be at risk if you have sex with more than one person, especially if your partners do not wear condoms. For men · Tests for sexually transmitted infections (STIs). Ask whether you should have tests for STIs. You may be at risk if you have sex with more than one person, especially if you do not wear a condom. · Testicular cancer exam. Ask your doctor whether you should check your testicles regularly. · Prostate exam. Talk to your doctor about whether you should have a blood test (called a PSA test) for prostate cancer. Experts differ on whether and when men should have this test. Some experts suggest it if you are older than 39 and are -American or have a father or brother who got prostate cancer when he was younger than 72. When should you call for help? Watch closely for changes in your health, and be sure to contact your doctor if you have any problems or symptoms that concern you. Where can you learn more? Go to http://rj-td.info/. Enter P072 in the search box to learn more about \"Well Visit, Ages 25 to 48: Care Instructions. \" Current as of: March 28, 2018 Content Version: 11.9 © 8542-0495 [employer]. Care instructions adapted under license by [employer] (which disclaims liability or warranty for this information). If you have questions about a medical condition or this instruction, always ask your healthcare professional. [place] [address] any warranty or liability for your use of this information.

## 2023-02-09 ENCOUNTER — HOSPITAL ENCOUNTER (OUTPATIENT)
Dept: MAMMOGRAPHY | Age: 43
Discharge: HOME | End: 2023-02-09
Attending: NURSE PRACTITIONER
Payer: COMMERCIAL

## 2023-02-09 DIAGNOSIS — Z12.31 VISIT FOR SCREENING MAMMOGRAM: ICD-10-CM

## 2023-02-09 PROCEDURE — 77063 BREAST TOMOSYNTHESIS BI: CPT

## 2023-02-23 ENCOUNTER — HOSPITAL ENCOUNTER (OUTPATIENT)
Dept: MAMMOGRAPHY | Age: 43
Discharge: HOME | End: 2023-02-23
Payer: COMMERCIAL

## 2023-02-23 ENCOUNTER — HOSPITAL ENCOUNTER (OUTPATIENT)
Dept: MAMMOGRAPHY | Age: 43
End: 2023-02-23
Payer: COMMERCIAL

## 2023-02-23 DIAGNOSIS — R92.8 ABNORMAL MAMMOGRAM OF RIGHT BREAST: ICD-10-CM

## 2023-02-23 PROCEDURE — 76642 ULTRASOUND BREAST LIMITED: CPT

## 2023-02-23 PROCEDURE — 77061 BREAST TOMOSYNTHESIS UNI: CPT

## 2023-03-29 NOTE — PROGRESS NOTES
Annual exam    Fransico Lozada is a 44 yo G0 who presents for her annual checkup. Doing well. No complaints. Rare light bleeding with Mirena IUD. Ob/Gyn Hx:  G0   Menarche- age 15  Menses- irregular with IUD  Contraception- Mirena IUD placed 18  STI- denies, declines testing today  ? SA- yes     Health maintenance:  Pap- 18 ASCUS pap/HPV neg  Mammo- 23 B0; 23 Diagnostic mammo and US benign, multiple simple cyst in rt breast    No past medical history on file. Past Surgical History:   Procedure Laterality Date    HX ORTHOPAEDIC  2011    finger       Family History   Problem Relation Age of Onset    Celiac Disease Mother     Bipolar Disorder Sister     Breast Cancer Paternal Grandmother         age unknown    No Known Problems Father        Social History     Socioeconomic History    Marital status: SINGLE     Spouse name: Not on file    Number of children: Not on file    Years of education: Not on file    Highest education level: Not on file   Occupational History    Not on file   Tobacco Use    Smoking status: Former     Types: Cigarettes     Quit date: 2006     Years since quittin.2    Smokeless tobacco: Never   Substance and Sexual Activity    Alcohol use: Yes     Alcohol/week: 0.0 standard drinks     Comment: 1-2 monthly    Drug use: No    Sexual activity: Not Currently     Partners: Male     Birth control/protection: Condom   Other Topics Concern    Not on file   Social History Narrative    Not on file     Social Determinants of Health     Financial Resource Strain: Not on file   Food Insecurity: Not on file   Transportation Needs: Not on file   Physical Activity: Not on file   Stress: Not on file   Social Connections: Not on file   Intimate Partner Violence: Not on file   Housing Stability: Not on file       Current Outpatient Medications   Medication Sig Dispense Refill    MELATONIN PO Take  by mouth. BIOTIN PO Take  by mouth. MULTIVITAMIN PO Take  by mouth. No Known Allergies    Review of Systems - History obtained from the patient  Constitutional: negative for weight loss, fever, night sweats  HEENT: negative for hearing loss, earache, congestion, snoring, sorethroat  CV: negative for chest pain, palpitations, edema  Resp: negative for cough, shortness of breath, wheezing  GI: negative for change in bowel habits, abdominal pain, black or bloody stools  : negative for frequency, dysuria, hematuria, vaginal discharge  MSK: negative for back pain, joint pain, muscle pain  Breast: negative for breast lumps, nipple discharge, galactorrhea  Skin :negative for itching, rash, hives  Neuro: negative for dizziness, headache, confusion, weakness  Psych: negative for anxiety, depression, change in mood  Heme/lymph: negative for bleeding, bruising, pallor    Physical Exam  Visit Vitals  /70 (BP 1 Location: Right upper arm, BP Patient Position: Sitting, BP Cuff Size: Adult)   Wt 173 lb (78.5 kg)   LMP 03/27/2023   BMI 25.55 kg/m²     Constitutional  Appearance: well-nourished, well developed, alert, in no acute distress    HENT  Head and Face: appears normal    Neck  Inspection/Palpation: normal appearance, no masses or tenderness  Lymph Nodes: no lymphadenopathy present  Thyroid: gland size normal, nontender, no nodules or masses present on palpation    Chest  Respiratory Effort: non-labored breathing  Auscultation: CTAB, normal breath sounds    Cardiovascular  Heart:   Auscultation: regular rate and rhythm without murmur  Extremities: no peripheral edema    Breasts  Inspection of Breasts: breasts symmetrical, no skin changes, no discharge present, nipple appearance normal, no skin retraction present  Palpation of Breasts and Axillae: no masses present on palpation, no breast tenderness  Axillary Lymph Nodes: no lymphadenopathy present    Gastrointestinal  Abdominal Examination: abdomen non-tender to palpation, normal bowel sounds, no masses present  Liver and spleen: no hepatomegaly present, spleen not palpable  Hernias: no hernias identified    Genitourinary  External Genitalia: normal appearance for age, no discharge present, no tenderness present, no inflammatory lesions present, no masses present, no atrophy present  Vagina: normal vaginal vault without central or paravaginal defects, no discharge present, no inflammatory lesions present, no masses present  Bladder: non-tender to palpation  Urethra: appears normal  Cervix: normal, IUD strings 1cm  Uterus: normal size, shape and consistency  Adnexa: no adnexal tenderness present, no adnexal masses present  Perineum: perineum within normal limits, no evidence of trauma, no rashes or skin lesions present    Skin  General Inspection: no rash, no lesions identified    Neurologic/Psychiatric  Mental Status:  Orientation: grossly oriented to person, place and time  Mood and Affect: mood normal, affect appropriate      Assessment/Plan:  43 y.o. G0 presenting for annual exam. Overall doing well.      Health Maintenance:  -counseled re: diet, exercise, healthy lifestyle  -pap/HPV today  -STI screening declined  -mammo utd, repeat 1 year  -continue Mirena IUD, due for removal 8/2026    RTC: 1 year for AE or sooner eboni Mayen MD  3/31/2023  1:48 PM

## 2023-03-31 ENCOUNTER — OFFICE VISIT (OUTPATIENT)
Dept: OBGYN CLINIC | Age: 43
End: 2023-03-31

## 2023-03-31 VITALS — SYSTOLIC BLOOD PRESSURE: 108 MMHG | WEIGHT: 173 LBS | DIASTOLIC BLOOD PRESSURE: 70 MMHG | BODY MASS INDEX: 25.55 KG/M2

## 2023-03-31 DIAGNOSIS — Z01.419 ENCOUNTER FOR GYNECOLOGICAL EXAMINATION WITHOUT ABNORMAL FINDING: Primary | ICD-10-CM

## 2023-06-01 ENCOUNTER — OFFICE VISIT (OUTPATIENT)
Age: 43
End: 2023-06-01
Payer: COMMERCIAL

## 2023-06-01 VITALS — WEIGHT: 175.6 LBS

## 2023-06-01 DIAGNOSIS — R87.615 PAP SMEAR OF CERVIX UNSATISFACTORY: Primary | ICD-10-CM

## 2023-06-01 PROCEDURE — 99213 OFFICE O/P EST LOW 20 MIN: CPT | Performed by: OBSTETRICS & GYNECOLOGY

## 2023-06-01 NOTE — PROGRESS NOTES
Problem Visit    Colleen Berry is a 43 y.o. G0 presenting for problem visit. Her main concern today is repeat pap smear. 23 Unsat/HPV pos    More frequent spotting with IUD    Ob/Gyn Hx:  G0   Menarche- age 17  Menses- irregular with IUD  Contraception- Mirena IUD placed 18  STI- denies  ? SA- yes     Health maintenance:  Pap- 23 Unsat/HPV+; 18 ASCUS pap/HPV neg  Mammo- 23 Diagnostic mammo and US benign, multiple simple cyst in rt breast; 23 B0    No past medical history on file. Past Surgical History:   Procedure Laterality Date    ORTHOPEDIC SURGERY  2011    finger       Family History   Problem Relation Age of Onset    Breast Cancer Paternal Grandmother         age unknown    Bipolar Disorder Sister     No Known Problems Father     Celiac Disease Mother        Social History     Socioeconomic History    Marital status: Single     Spouse name: Not on file    Number of children: Not on file    Years of education: Not on file    Highest education level: Not on file   Occupational History    Not on file   Tobacco Use    Smoking status: Former     Types: Cigarettes     Quit date: 2006     Years since quittin.4    Smokeless tobacco: Never   Substance and Sexual Activity    Alcohol use:  Yes     Alcohol/week: 0.0 standard drinks    Drug use: No    Sexual activity: Not on file   Other Topics Concern    Not on file   Social History Narrative    Not on file     Social Determinants of Health     Financial Resource Strain: Not on file   Food Insecurity: Not on file   Transportation Needs: Not on file   Physical Activity: Not on file   Stress: Not on file   Social Connections: Not on file   Intimate Partner Violence: Not At Risk    Fear of Current or Ex-Partner: No    Emotionally Abused: No    Physically Abused: No    Sexually Abused: No   Housing Stability: Not on file       Current Outpatient Medications   Medication Sig Dispense Refill    Levonorgestrel (MIRENA, 52 MG, IU) by

## 2023-06-07 LAB
CYTOLOGIST CVX/VAG CYTO: NORMAL
CYTOLOGY CVX/VAG DOC CYTO: NORMAL
CYTOLOGY CVX/VAG DOC THIN PREP: NORMAL
DX ICD CODE: NORMAL
HPV GENOTYPE REFLEX: NORMAL
HPV I/H RISK 4 DNA CVX QL PROBE+SIG AMP: NEGATIVE
Lab: NORMAL
Lab: NORMAL
OTHER STN SPEC: NORMAL
STAT OF ADQ CVX/VAG CYTO-IMP: NORMAL

## 2024-01-26 ENCOUNTER — TRANSCRIBE ORDERS (OUTPATIENT)
Facility: HOSPITAL | Age: 44
End: 2024-01-26

## 2024-01-26 DIAGNOSIS — Z12.31 VISIT FOR SCREENING MAMMOGRAM: Primary | ICD-10-CM

## 2024-02-12 ENCOUNTER — HOSPITAL ENCOUNTER (OUTPATIENT)
Facility: HOSPITAL | Age: 44
Discharge: HOME | End: 2024-02-15
Payer: COMMERCIAL

## 2024-02-12 VITALS — BODY MASS INDEX: 26.66 KG/M2 | HEIGHT: 69 IN | WEIGHT: 180 LBS

## 2024-02-12 DIAGNOSIS — Z12.31 VISIT FOR SCREENING MAMMOGRAM: ICD-10-CM

## 2024-02-12 PROCEDURE — 77067 SCR MAMMO BI INCL CAD: CPT

## 2024-02-14 ENCOUNTER — TRANSCRIBE ORDERS (OUTPATIENT)
Facility: HOSPITAL | Age: 44
End: 2024-02-14

## 2024-02-14 DIAGNOSIS — R92.8 ABNORMAL MAMMOGRAM OF LEFT BREAST: Primary | ICD-10-CM

## 2024-02-26 ENCOUNTER — HOSPITAL ENCOUNTER (OUTPATIENT)
Facility: HOSPITAL | Age: 44
Discharge: HOME | End: 2024-02-29
Payer: COMMERCIAL

## 2024-02-26 DIAGNOSIS — R92.8 ABNORMAL MAMMOGRAM OF LEFT BREAST: ICD-10-CM

## 2024-02-26 PROCEDURE — 76642 ULTRASOUND BREAST LIMITED: CPT

## 2024-02-26 PROCEDURE — G0279 TOMOSYNTHESIS, MAMMO: HCPCS

## 2025-02-20 NOTE — PROGRESS NOTES
Richard Snider is a 44 y.o. female returns for an annual exam.    Chief Complaint   Patient presents with    Annual Exam     Ob/Gyn Hx:  G0   Menarche- age 12  Menses- irregular with IUD  Contraception- Mirena IUD placed 8/8/18  STI- denies  ?SA- yes     Health maintenance:  Pap- 06/01/23 NILM/HPV neg, 03/31/23 Unsat/HPV+; 7/18/18 ASCUS pap/HPV neg  Mammo- 2/26/24 B2, 02/23/23 Diagnostic mammo and US benign, multiple simple cyst in rt breast; 1/31/23 B0     1. Have you been to the ER, urgent care clinic, or hospitalized since your last visit? No    2. Have you seen or consulted any other health care providers outside of the [facility] since your last visit? No  Oneyda Carlton LPN  2/21/2025  9:10 AM     Verbal order obtained from Kailee Grey MD for screening mammogram and a diagnosis of breast cancer screening. Order read back to MD. Orders signed by this writer and sent for co-sign to MD.   Oneyda Carlton LPN  2/21/2025  9:11 AM

## 2025-02-21 ENCOUNTER — OFFICE VISIT (OUTPATIENT)
Age: 45
End: 2025-02-21

## 2025-02-21 VITALS
HEIGHT: 69 IN | TEMPERATURE: 98 F | SYSTOLIC BLOOD PRESSURE: 111 MMHG | HEART RATE: 83 BPM | DIASTOLIC BLOOD PRESSURE: 74 MMHG | RESPIRATION RATE: 17 BRPM | BODY MASS INDEX: 26.63 KG/M2 | OXYGEN SATURATION: 97 % | WEIGHT: 179.8 LBS

## 2025-02-21 DIAGNOSIS — Z01.419 ENCOUNTER FOR GYNECOLOGICAL EXAMINATION (GENERAL) (ROUTINE) WITHOUT ABNORMAL FINDINGS: ICD-10-CM

## 2025-02-21 DIAGNOSIS — Z12.31 ENCOUNTER FOR SCREENING MAMMOGRAM FOR MALIGNANT NEOPLASM OF BREAST: Primary | ICD-10-CM

## 2025-02-21 DIAGNOSIS — Z11.3 SCREENING EXAMINATION FOR STD (SEXUALLY TRANSMITTED DISEASE): ICD-10-CM

## 2025-02-21 DIAGNOSIS — N63.20 MASS OF LEFT BREAST, UNSPECIFIED QUADRANT: ICD-10-CM

## 2025-02-21 SDOH — ECONOMIC STABILITY: FOOD INSECURITY: WITHIN THE PAST 12 MONTHS, THE FOOD YOU BOUGHT JUST DIDN'T LAST AND YOU DIDN'T HAVE MONEY TO GET MORE.: NEVER TRUE

## 2025-02-21 SDOH — ECONOMIC STABILITY: FOOD INSECURITY: WITHIN THE PAST 12 MONTHS, YOU WORRIED THAT YOUR FOOD WOULD RUN OUT BEFORE YOU GOT MONEY TO BUY MORE.: NEVER TRUE

## 2025-02-21 ASSESSMENT — PATIENT HEALTH QUESTIONNAIRE - PHQ9
1. LITTLE INTEREST OR PLEASURE IN DOING THINGS: NOT AT ALL
SUM OF ALL RESPONSES TO PHQ QUESTIONS 1-9: 0
SUM OF ALL RESPONSES TO PHQ9 QUESTIONS 1 & 2: 0
SUM OF ALL RESPONSES TO PHQ QUESTIONS 1-9: 0
SUM OF ALL RESPONSES TO PHQ QUESTIONS 1-9: 0
2. FEELING DOWN, DEPRESSED OR HOPELESS: NOT AT ALL
SUM OF ALL RESPONSES TO PHQ QUESTIONS 1-9: 0

## 2025-02-21 NOTE — PROGRESS NOTES
Annual exam    Richard Snider is a 44 y.o. G0 presenting for annual exam.     Pt doing well overall. Rare light spotting with Mirena IUD. Due for removal/replacement 2026.     Pt is sexually active, new relationship, desires full STI testing at this time.     Due for repeat pap today as well. (H/o ascus  and hpv+ ). Declines gardasil vaccine.    Needs new PCP, requests referral within [facility] system    Ob/Gyn Hx:  G0   No LMP recorded. (Menstrual status: IUD).  Menarche- age 12  Menses- irregular with IUD  Contraception- Mirena IUD placed 18  STI- denies  ?SA- yes     Health maintenance:  Pap- 23 NILM/HPV neg, 23 Unsat/HPV+; 18 ASCUS pap/HPV neg  Mammo- 24 B2, 23 Diagnostic mammo and US benign, multiple simple cyst in rt breast; 23 B0  Colonoscopy- age 45  Gardasil-declines    Past Medical History:   Diagnosis Date    Abnormal Pap smear of cervix 3/31/23       Past Surgical History:   Procedure Laterality Date    ORTHOPEDIC SURGERY  2011    finger       Family History   Problem Relation Age of Onset    Breast Cancer Paternal Grandmother         age unknown    Bipolar Disorder Sister     No Known Problems Father     Celiac Disease Mother        Social History     Socioeconomic History    Marital status: Single     Spouse name: Not on file    Number of children: Not on file    Years of education: Not on file    Highest education level: Not on file   Occupational History    Not on file   Tobacco Use    Smoking status: Former     Current packs/day: 0.00     Types: Cigarettes     Quit date: 2006     Years since quittin.1    Smokeless tobacco: Never   Substance and Sexual Activity    Alcohol use: Yes    Drug use: No    Sexual activity: Yes     Partners: Male     Birth control/protection: Condom   Other Topics Concern    Not on file   Social History Narrative    Not on file     Social Determinants of Health     Financial Resource Strain: Not on file   Food

## 2025-02-22 LAB
HBV SURFACE AG SERPL QL IA: NEGATIVE
HCV IGG SERPL QL IA: NON REACTIVE
HIV 1+2 AB+HIV1 P24 AG SERPL QL IA: NON REACTIVE

## 2025-02-24 ENCOUNTER — CLINICAL DOCUMENTATION (OUTPATIENT)
Age: 45
End: 2025-02-24

## 2025-02-24 NOTE — PROGRESS NOTES
Called and l/m for return call for \"mass of left breast unspecified quadrant\" Referred by Marcial Parikh

## 2025-02-25 LAB — TREPONEMA PALLIDUM IGG+IGM AB [PRESENCE] IN SERUM OR PLASMA BY IMMUNOASSAY: NON REACTIVE

## 2025-02-27 LAB
C TRACH RRNA CVX QL NAA+PROBE: NEGATIVE
CYTOLOGIST CVX/VAG CYTO: NORMAL
CYTOLOGY CVX/VAG DOC CYTO: NORMAL
CYTOLOGY CVX/VAG DOC THIN PREP: NORMAL
DX ICD CODE: NORMAL
HPV GENOTYPE REFLEX: NORMAL
HPV I/H RISK 4 DNA CVX QL PROBE+SIG AMP: NEGATIVE
N GONORRHOEA RRNA CVX QL NAA+PROBE: NEGATIVE
OTHER STN SPEC: NORMAL
SERVICE CMNT-IMP: NORMAL
STAT OF ADQ CVX/VAG CYTO-IMP: NORMAL
T VAGINALIS RRNA SPEC QL NAA+PROBE: NEGATIVE

## 2025-03-24 ENCOUNTER — OFFICE VISIT (OUTPATIENT)
Age: 45
End: 2025-03-24
Payer: COMMERCIAL

## 2025-03-24 VITALS — HEIGHT: 69 IN | WEIGHT: 178 LBS | BODY MASS INDEX: 26.36 KG/M2

## 2025-03-24 DIAGNOSIS — N63.20 MASSES OF BOTH BREASTS: Primary | ICD-10-CM

## 2025-03-24 DIAGNOSIS — N63.10 MASSES OF BOTH BREASTS: Primary | ICD-10-CM

## 2025-03-24 DIAGNOSIS — Z80.3 FAMILY HISTORY OF BREAST CANCER: ICD-10-CM

## 2025-03-24 DIAGNOSIS — Z13.79 ASHKENAZI JEWISH ANCESTRY REQUIRING POPULATION-SPECIFIC GENETIC SCREENING: ICD-10-CM

## 2025-03-24 PROCEDURE — 99203 OFFICE O/P NEW LOW 30 MIN: CPT | Performed by: SURGERY

## 2025-03-24 NOTE — PROGRESS NOTES
Multiple        Live Births   0          Obstetric Comments   Menarche 13, LMP 3/10/25, # of children 0, age of 1st delivery n/a, Hysterectomy/oophorectomy no/no, Breast bx no, history of breast feeding no, BCP yes, Hormone therapy no               Current Outpatient Medications:     CALCIUM PO, Take by mouth, Disp: , Rfl:     Collagen-Vitamin C-Biotin (COLLAGEN PO), Take by mouth, Disp: , Rfl:     Levonorgestrel (MIRENA, 52 MG, IU), by IntraUTERine route, Disp: , Rfl:   No Known Allergies     Review of Systems   All other systems reviewed and are negative.        Physical Exam  Vitals and nursing note reviewed.   Chest:   Breasts:     Right: No swelling, bleeding, inverted nipple, mass, nipple discharge, skin change or tenderness.      Left: Mass present. No swelling, bleeding, inverted nipple, nipple discharge, skin change or tenderness.   Lymphadenopathy:      Upper Body:      Right upper body: No axillary adenopathy.      Left upper body: No axillary adenopathy.            ASSESSMENT and PLAN   Diagnosis Orders   1. Masses of both breasts  FILI SHYAM DIGITAL DIAGNOSTIC BILATERAL    US BREAST LIMITED RIGHT    US BREAST LIMITED LEFT      2. Family history of breast cancer  Parvin Avila CGC, Genetic [facility] ([address])      3. 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

## 2025-04-10 ENCOUNTER — HOSPITAL ENCOUNTER (OUTPATIENT)
Facility: HOSPITAL | Age: 45
Discharge: HOME | End: 2025-04-13
Attending: SURGERY
Payer: COMMERCIAL

## 2025-04-10 VITALS — BODY MASS INDEX: 25.62 KG/M2 | WEIGHT: 173 LBS | HEIGHT: 69 IN

## 2025-04-10 DIAGNOSIS — N63.10 MASSES OF BOTH BREASTS: ICD-10-CM

## 2025-04-10 DIAGNOSIS — N63.20 MASSES OF BOTH BREASTS: ICD-10-CM

## 2025-04-10 PROCEDURE — 76642 ULTRASOUND BREAST LIMITED: CPT

## 2025-04-10 PROCEDURE — 77066 DX MAMMO INCL CAD BI: CPT

## 2025-04-15 ENCOUNTER — TELEPHONE (OUTPATIENT)
Age: 45
End: 2025-04-15

## 2025-04-15 DIAGNOSIS — Z91.89 AT HIGH RISK FOR BREAST CANCER: Primary | ICD-10-CM

## 2025-04-17 NOTE — PROGRESS NOTES
[facility] [place] [facility] [place]  [facility] of [facility]  Genetic Counseling   [address]  [place] [address]  Phone: [phone]  Fax: [phone]    Date of Visit: 4/18/2025  Patient Name: Richard Snider  YOB: 1980  Referring Provider: Gala Marina MD ([facility])    HISTORY OF PRESENT ILLNESS  Richard Snider is a 44 y.o. female with no personal cancer history referred by Dr. Marina for genetic counseling due to family history of breast cancer and Ashkenazi Yarsani ancestry. She was recently referred to Dr. Marina due to a left breast mass. She reports no prior genetic testing for hereditary cancer predisposition in herself  or other relatives. We reviewed her medical and family history and discussed germline genetic testing options, including the implications of the different types of possible test results.     The appointment was conducted in person and Ms. Snider was unaccompanied to the appointment.     Medical History  Cancer History:  No personal cancer history.    Gynecologic History:  Menarche 13, LMP 3/10/25, # of children 0, age of 1st delivery n/a, Hysterectomy/oophorectomy no/no, Breast bx no, history of breast feeding no, BCP yes, Hormone therapy no   Ovaries and uterus intact    Cancer Screening History:  Breast cancer screening:  [place]: 27.56%  Date of last Mammogram: 4/10/2025. BI-RADS Assessment Category 2: Benign finding. Multiple bilateral simple cysts.  MRI ordered by Dr. Marina due to high risk.    Gynecological cancer screening  Date of last Cervical Cancer screen (HPV or PAP): 2/21/2025 wnl  H/o ascus 2018 and hpv+ 2023  GI cancer screening  Not initiated due to age.   Skin cancer screening  Last full body skin exam within last six months   Patient denies other cancer screenings    Social History     Tobacco Use    Smoking status: Former     Current packs/day: 0.00     Types: Cigarettes     Quit date: 1/1/2006     Years since

## 2025-04-18 ENCOUNTER — INITIAL CONSULT (OUTPATIENT)
Age: 45
End: 2025-04-18

## 2025-04-18 DIAGNOSIS — Z80.3 FAMILY HISTORY OF BREAST CANCER: Primary | ICD-10-CM

## 2025-04-18 DIAGNOSIS — Z13.79 ASHKENAZI JEWISH ANCESTRY REQUIRING POPULATION-SPECIFIC GENETIC SCREENING: ICD-10-CM

## 2025-05-13 ENCOUNTER — HOSPITAL ENCOUNTER (OUTPATIENT)
Age: 45
Discharge: HOME | End: 2025-05-16
Payer: COMMERCIAL

## 2025-05-13 DIAGNOSIS — Z91.89 AT HIGH RISK FOR BREAST CANCER: ICD-10-CM

## 2025-05-13 PROBLEM — Z13.71 BRCA2 NEGATIVE: Status: ACTIVE | Noted: 2025-05-13

## 2025-05-13 PROBLEM — Z13.71 BRCA1 NEGATIVE: Status: ACTIVE | Noted: 2025-05-13

## 2025-05-13 PROCEDURE — C8908 MRI W/O FOL W/CONT, BREAST,: HCPCS

## 2025-05-13 PROCEDURE — 6360000004 HC RX CONTRAST MEDICATION: Performed by: PHYSICIAN ASSISTANT

## 2025-05-13 PROCEDURE — A9585 GADOBUTROL INJECTION: HCPCS | Performed by: PHYSICIAN ASSISTANT

## 2025-05-13 RX ORDER — GADOBUTROL 604.72 MG/ML
8 INJECTION INTRAVENOUS
Status: COMPLETED | OUTPATIENT
Start: 2025-05-13 | End: 2025-05-13

## 2025-05-13 RX ADMIN — GADOBUTROL 8 ML: 604.72 INJECTION INTRAVENOUS at 09:51

## 2025-05-13 NOTE — PROGRESS NOTES
[facility] [place] [facility] [place]  A Part of [facility]  Genetic Counseling   [address]  [place] [address]  Phone: [phone]  Fax: [phone]    Date: 5/14/2025  Patient Name: Richard Snider  YOB: 1980  Referring Provider: Gala Marina MD ([facility])     INDICATION: Disclosure of germline genetic test results by phone.    INTERVAL HISTORY: Richard Snider is a 44 y.o. female who recently underwent germline genetic testing due to due to family history of breast cancer and Ashkenazi Anabaptist ancestry. I spoke with Ms. Snider by phone today to review the results of her genetic testing and their implications.    TEST RESULTS: NEGATIVE  Testing performed: [email or web address]  Genes Analyzed (19 total): NATIVIDAD, BARD1, BRCA1, BRCA2, BRIP1, CDH1, CHEK2, MLH1, MSH2, MSH6, NF1, PALB2, PMS2, PTEN, RAD51C, RAD51D, STK11 and TP53 (sequencing and deletion/duplication); EPCAM (deletion/duplication only).   A full copy of results is available in the patient's record for additional details.     INTERPRETATION & DISCUSSION:  Genetic testing did not identify any genetic mutations associated with a hereditary cancer syndrome.  This test cannot exclude the possibility of a mutation in this patient in a gene which was not included in her analysis.  The negative result cannot completely eliminate the possibility that the patient and/or their family members have a hereditary susceptibility to cancer. Genetic testing has less than 100% sensitivity, meaning there is a small possibility that a pathogenic variant exists in one of the genes analyzed which cannot be identified by current testing methodology. It is possible that the patient did not inherit a genetic mutation which exisits in other relatives. It is also possible that pathogenic variants in cancer susceptibility genes which have not yet been discovered may be contributing to the personal and/or family history of

## 2025-05-14 ENCOUNTER — SCHEDULED TELEPHONE ENCOUNTER (OUTPATIENT)
Age: 45
End: 2025-05-14

## 2025-05-14 ENCOUNTER — TELEPHONE (OUTPATIENT)
Age: 45
End: 2025-05-14

## 2025-05-14 DIAGNOSIS — Z71.83 ENCOUNTER FOR NONPROCREATIVE GENETIC COUNSELING: ICD-10-CM

## 2025-05-14 DIAGNOSIS — Z13.71 BRCA1 NEGATIVE: Primary | ICD-10-CM

## 2025-05-14 DIAGNOSIS — R92.8 ABNORMAL MRI, BREAST: Primary | ICD-10-CM

## 2025-05-14 DIAGNOSIS — Z13.71 BRCA2 NEGATIVE: ICD-10-CM

## 2025-05-27 ENCOUNTER — HOSPITAL ENCOUNTER (OUTPATIENT)
Facility: HOSPITAL | Age: 45
Discharge: HOME | End: 2025-05-30
Attending: SURGERY
Payer: COMMERCIAL

## 2025-05-27 DIAGNOSIS — R92.8 ABNORMAL MRI, BREAST: ICD-10-CM

## 2025-05-27 PROCEDURE — 76642 ULTRASOUND BREAST LIMITED: CPT

## (undated) LAB
CYTOLOGIST CVX/VAG CYTO: (no result)
CYTOLOGY CVX/VAG DOC CYTO: (no result)
CYTOLOGY CVX/VAG DOC THIN PREP: (no result)
DX ICD CODE: (no result)
HPV GENOTYPE REFLEX: (no result)
HPV I/H RISK 4 DNA CVX QL PROBE+SIG AMP: POSITIVE
Lab: (no result)
Lab: (no result)
OTHER STN SPEC: (no result)
STAT OF ADQ CVX/VAG CYTO-IMP: (no result)